# Patient Record
Sex: MALE | Race: OTHER
[De-identification: names, ages, dates, MRNs, and addresses within clinical notes are randomized per-mention and may not be internally consistent; named-entity substitution may affect disease eponyms.]

---

## 2020-01-14 VITALS
DIASTOLIC BLOOD PRESSURE: 41 MMHG | SYSTOLIC BLOOD PRESSURE: 94 MMHG | SYSTOLIC BLOOD PRESSURE: 94 MMHG | DIASTOLIC BLOOD PRESSURE: 41 MMHG

## 2020-12-28 ENCOUNTER — HOSPITAL ENCOUNTER (OUTPATIENT)
Dept: HOSPITAL 61 - SURGPAT | Age: 65
End: 2020-12-28
Attending: ORTHOPAEDIC SURGERY
Payer: COMMERCIAL

## 2020-12-28 DIAGNOSIS — Z01.818: Primary | ICD-10-CM

## 2020-12-28 DIAGNOSIS — M16.11: ICD-10-CM

## 2020-12-28 DIAGNOSIS — R91.8: ICD-10-CM

## 2020-12-28 LAB
ALBUMIN SERPL-MCNC: 3.4 G/DL (ref 3.4–5)
ANION GAP SERPL CALC-SCNC: 12 MMOL/L (ref 6–14)
APTT BLD: 29 SEC (ref 24–38)
BASOPHILS # BLD AUTO: 0.1 X10^3/UL (ref 0–0.2)
BASOPHILS NFR BLD: 2 % (ref 0–3)
BUN SERPL-MCNC: 23 MG/DL (ref 8–26)
CALCIUM SERPL-MCNC: 8.9 MG/DL (ref 8.5–10.1)
CHLORIDE SERPL-SCNC: 103 MMOL/L (ref 98–107)
CO2 SERPL-SCNC: 26 MMOL/L (ref 21–32)
CREAT SERPL-MCNC: 0.8 MG/DL (ref 0.7–1.3)
CRP SERPL-MCNC: 10.4 MG/L (ref 0–3.3)
EOSINOPHIL NFR BLD: 0.2 X10^3/UL (ref 0–0.7)
EOSINOPHIL NFR BLD: 3 % (ref 0–3)
ERYTHROCYTE [DISTWIDTH] IN BLOOD BY AUTOMATED COUNT: 16.7 % (ref 11.5–14.5)
GFR SERPLBLD BASED ON 1.73 SQ M-ARVRAT: 97.3 ML/MIN
GLUCOSE SERPL-MCNC: 82 MG/DL (ref 70–99)
HBA1C MFR BLD: 5.9 % (ref 4.8–5.6)
HCT VFR BLD CALC: 29.4 % (ref 39–53)
HGB BLD-MCNC: 9.9 G/DL (ref 13–17.5)
LYMPHOCYTES # BLD: 0.8 X10^3/UL (ref 1–4.8)
LYMPHOCYTES NFR BLD AUTO: 13 % (ref 24–48)
MCH RBC QN AUTO: 29 PG (ref 25–35)
MCHC RBC AUTO-ENTMCNC: 34 G/DL (ref 31–37)
MCV RBC AUTO: 86 FL (ref 79–100)
MONO #: 0.6 X10^3/UL (ref 0–1.1)
MONOCYTES NFR BLD: 10 % (ref 0–9)
NEUT #: 4.8 X10^3/UL (ref 1.8–7.7)
NEUTROPHILS NFR BLD AUTO: 73 % (ref 31–73)
PLATELET # BLD AUTO: 344 X10^3/UL (ref 140–400)
POTASSIUM SERPL-SCNC: 3.8 MMOL/L (ref 3.5–5.1)
PROTHROMBIN TIME: 12 SEC (ref 11.7–14)
RBC # BLD AUTO: 3.43 X10^6/UL (ref 4.3–5.7)
SODIUM SERPL-SCNC: 141 MMOL/L (ref 136–145)
WBC # BLD AUTO: 6.6 X10^3/UL (ref 4–11)

## 2020-12-28 PROCEDURE — 71046 X-RAY EXAM CHEST 2 VIEWS: CPT

## 2020-12-28 PROCEDURE — 86140 C-REACTIVE PROTEIN: CPT

## 2020-12-28 PROCEDURE — 85730 THROMBOPLASTIN TIME PARTIAL: CPT

## 2020-12-28 PROCEDURE — 93005 ELECTROCARDIOGRAM TRACING: CPT

## 2020-12-28 PROCEDURE — 83036 HEMOGLOBIN GLYCOSYLATED A1C: CPT

## 2020-12-28 PROCEDURE — 85610 PROTHROMBIN TIME: CPT

## 2020-12-28 PROCEDURE — 87641 MR-STAPH DNA AMP PROBE: CPT

## 2020-12-28 PROCEDURE — 85025 COMPLETE CBC W/AUTO DIFF WBC: CPT

## 2020-12-28 PROCEDURE — 82306 VITAMIN D 25 HYDROXY: CPT

## 2020-12-28 PROCEDURE — 82040 ASSAY OF SERUM ALBUMIN: CPT

## 2020-12-28 PROCEDURE — 36415 COLL VENOUS BLD VENIPUNCTURE: CPT

## 2020-12-28 PROCEDURE — 80048 BASIC METABOLIC PNL TOTAL CA: CPT

## 2020-12-28 NOTE — EKG
[facility] acute hospital

              [address] [place] [address]

Test Date:    2020               Test Time:    10:53:01

Pat Name:     CHAY LOYOLA          Department:   

Patient ID:   [id number]           Room:          

Gender:       M                        Technician:   

:          1955               Requested By: DRU MUNOZ

Order Number: [id number]           Reading MD:   Chidi Maxwell

                                 Measurements

Intervals                              Axis          

Rate:         69                       P:            54

ME:           138                      QRS:          70

QRSD:         74                       T:            59

QT:           380                                    

QTc:          409                                    

                           Interpretive Statements

SINUS RHYTHM

NORMAL ECG

RI6.02

Compared to ECG 2020 21:27:32

No significant changes

Electronically Signed On 2020 11:49:07 CST by Chidi Maxwell

## 2020-12-28 NOTE — RAD
XR CHEST 2V 



INDICATION: Reason: PRE-OP RIGHT HIP REPLACEMENT 1/17/21 / Spl. Instructions:  / History: . 



COMPARISON STUDY: 1/12/2020.



FINDINGS:



Lungs: Normal lung volume. Right lower lung zone nodular opacity, new since 1/12/2020.



Pleura: No pleural effusion or pneumothorax.



Heart and Mediastinum: The cardiomediastinal silhouette is normal. The great vessels of the thorax ar
e normal.



Bones and Soft Tissues: The bones and soft tissues are within normal limits.



IMPRESSION:  

New right lower lung zone nodular opacity, nonspecific but could represent a focus of infection/infla
mmation or potentially a pulmonary nodule. This should be further characterized with unenhanced CT ch
est.



Electronically signed by: Emilio Marie MD (12/28/2020 1:45 PM) [id number]

## 2021-04-28 ENCOUNTER — HOSPITAL ENCOUNTER (OUTPATIENT)
Dept: HOSPITAL 61 - SURGPAT | Age: 66
End: 2021-04-28
Attending: ORTHOPAEDIC SURGERY
Payer: COMMERCIAL

## 2021-04-28 DIAGNOSIS — Z20.822: ICD-10-CM

## 2021-04-28 DIAGNOSIS — Z01.812: Primary | ICD-10-CM

## 2021-04-28 DIAGNOSIS — M16.11: ICD-10-CM

## 2021-04-28 LAB
ALBUMIN SERPL-MCNC: 4 G/DL (ref 3.4–5)
ANION GAP SERPL CALC-SCNC: 11 MMOL/L (ref 6–14)
APTT BLD: 29 SEC (ref 24–38)
BASOPHILS # BLD AUTO: 0.1 X10^3/UL (ref 0–0.2)
BASOPHILS NFR BLD: 1 % (ref 0–3)
BUN SERPL-MCNC: 20 MG/DL (ref 8–26)
CALCIUM SERPL-MCNC: 8.9 MG/DL (ref 8.5–10.1)
CHLORIDE SERPL-SCNC: 99 MMOL/L (ref 98–107)
CO2 SERPL-SCNC: 28 MMOL/L (ref 21–32)
CREAT SERPL-MCNC: 1.2 MG/DL (ref 0.7–1.3)
EOSINOPHIL NFR BLD: 0 X10^3/UL (ref 0–0.7)
EOSINOPHIL NFR BLD: 1 % (ref 0–3)
ERYTHROCYTE [DISTWIDTH] IN BLOOD BY AUTOMATED COUNT: 16.2 % (ref 11.5–14.5)
GFR SERPLBLD BASED ON 1.73 SQ M-ARVRAT: 60.8 ML/MIN
GLUCOSE SERPL-MCNC: 106 MG/DL (ref 70–99)
HCT VFR BLD CALC: 36.4 % (ref 39–53)
HGB BLD-MCNC: 12.3 G/DL (ref 13–17.5)
LYMPHOCYTES # BLD: 1.1 X10^3/UL (ref 1–4.8)
LYMPHOCYTES NFR BLD AUTO: 23 % (ref 24–48)
MCH RBC QN AUTO: 30 PG (ref 25–35)
MCHC RBC AUTO-ENTMCNC: 34 G/DL (ref 31–37)
MCV RBC AUTO: 88 FL (ref 79–100)
MONO #: 0.6 X10^3/UL (ref 0–1.1)
MONOCYTES NFR BLD: 11 % (ref 0–9)
NEUT #: 3.2 X10^3/UL (ref 1.8–7.7)
NEUTROPHILS NFR BLD AUTO: 64 % (ref 31–73)
PLATELET # BLD AUTO: 388 X10^3/UL (ref 140–400)
POTASSIUM SERPL-SCNC: 2.9 MMOL/L (ref 3.5–5.1)
PROTHROMBIN TIME: 12.4 SEC (ref 11.7–14)
RBC # BLD AUTO: 4.15 X10^6/UL (ref 4.3–5.7)
SODIUM SERPL-SCNC: 138 MMOL/L (ref 136–145)
WBC # BLD AUTO: 5 X10^3/UL (ref 4–11)

## 2021-04-28 PROCEDURE — 82040 ASSAY OF SERUM ALBUMIN: CPT

## 2021-04-28 PROCEDURE — U0003 INFECTIOUS AGENT DETECTION BY NUCLEIC ACID (DNA OR RNA); SEVERE ACUTE RESPIRATORY SYNDROME CORONAVIRUS 2 (SARS-COV-2) (CORONAVIRUS DISEASE [COVID-19]), AMPLIFIED PROBE TECHNIQUE, MAKING USE OF HIGH THROUGHPUT TECHNOLOGIES AS DESCRIBED BY CMS-2020-01-R: HCPCS

## 2021-04-28 PROCEDURE — 85651 RBC SED RATE NONAUTOMATED: CPT

## 2021-04-28 PROCEDURE — 85610 PROTHROMBIN TIME: CPT

## 2021-04-28 PROCEDURE — 85730 THROMBOPLASTIN TIME PARTIAL: CPT

## 2021-04-28 PROCEDURE — 82306 VITAMIN D 25 HYDROXY: CPT

## 2021-04-28 PROCEDURE — 85025 COMPLETE CBC W/AUTO DIFF WBC: CPT

## 2021-04-28 PROCEDURE — 83036 HEMOGLOBIN GLYCOSYLATED A1C: CPT

## 2021-04-28 PROCEDURE — 87641 MR-STAPH DNA AMP PROBE: CPT

## 2021-04-28 PROCEDURE — 80048 BASIC METABOLIC PNL TOTAL CA: CPT

## 2021-04-28 NOTE — NUR
PT HERE FOR PRETESTING PRIOR TO RIGHT HIP ARTHROPLASTY ON 5/4/2021. PREOP LABS K+ 2.9. DR ALEXANDRE'S OFFICE NOTIFIED OF LAB RESULTS. SPOKE WITH ROSIE DAVIES. FAXED LAB RESULTS TO ROSIE AT 
DR ALEXANDRE'S OFFICE.

## 2021-04-29 LAB — HBA1C MFR BLD: 5.8 % (ref 4.8–5.6)

## 2021-05-04 ENCOUNTER — HOSPITAL ENCOUNTER (INPATIENT)
Dept: HOSPITAL 61 - SURG | Age: 66
LOS: 3 days | Discharge: NURSING, REHAB OR LONG-TERM CARE | DRG: 470 | End: 2021-05-07
Attending: ORTHOPAEDIC SURGERY | Admitting: ORTHOPAEDIC SURGERY
Payer: COMMERCIAL

## 2021-05-04 VITALS — HEIGHT: 70 IN | WEIGHT: 132.94 LBS | BODY MASS INDEX: 19.03 KG/M2

## 2021-05-04 VITALS — DIASTOLIC BLOOD PRESSURE: 53 MMHG | SYSTOLIC BLOOD PRESSURE: 107 MMHG

## 2021-05-04 VITALS — SYSTOLIC BLOOD PRESSURE: 108 MMHG | DIASTOLIC BLOOD PRESSURE: 54 MMHG

## 2021-05-04 VITALS — SYSTOLIC BLOOD PRESSURE: 91 MMHG | DIASTOLIC BLOOD PRESSURE: 34 MMHG

## 2021-05-04 VITALS — SYSTOLIC BLOOD PRESSURE: 100 MMHG | DIASTOLIC BLOOD PRESSURE: 36 MMHG

## 2021-05-04 VITALS — DIASTOLIC BLOOD PRESSURE: 36 MMHG | SYSTOLIC BLOOD PRESSURE: 90 MMHG

## 2021-05-04 VITALS — DIASTOLIC BLOOD PRESSURE: 63 MMHG | SYSTOLIC BLOOD PRESSURE: 90 MMHG

## 2021-05-04 VITALS — DIASTOLIC BLOOD PRESSURE: 68 MMHG | SYSTOLIC BLOOD PRESSURE: 105 MMHG

## 2021-05-04 VITALS — DIASTOLIC BLOOD PRESSURE: 54 MMHG | SYSTOLIC BLOOD PRESSURE: 110 MMHG

## 2021-05-04 VITALS — SYSTOLIC BLOOD PRESSURE: 91 MMHG | DIASTOLIC BLOOD PRESSURE: 39 MMHG

## 2021-05-04 VITALS — DIASTOLIC BLOOD PRESSURE: 52 MMHG | SYSTOLIC BLOOD PRESSURE: 98 MMHG

## 2021-05-04 VITALS — SYSTOLIC BLOOD PRESSURE: 106 MMHG | DIASTOLIC BLOOD PRESSURE: 53 MMHG

## 2021-05-04 DIAGNOSIS — I10: ICD-10-CM

## 2021-05-04 DIAGNOSIS — G89.29: ICD-10-CM

## 2021-05-04 DIAGNOSIS — Z20.822: ICD-10-CM

## 2021-05-04 DIAGNOSIS — M16.11: Primary | ICD-10-CM

## 2021-05-04 DIAGNOSIS — Z79.899: ICD-10-CM

## 2021-05-04 DIAGNOSIS — Z96.641: ICD-10-CM

## 2021-05-04 DIAGNOSIS — F42.9: ICD-10-CM

## 2021-05-04 DIAGNOSIS — D62: ICD-10-CM

## 2021-05-04 LAB — PROTHROMBIN TIME: 12 SEC (ref 11.7–14)

## 2021-05-04 PROCEDURE — 0SR904Z REPLACEMENT OF RIGHT HIP JOINT WITH CERAMIC ON POLYETHYLENE SYNTHETIC SUBSTITUTE, OPEN APPROACH: ICD-10-PCS | Performed by: ORTHOPAEDIC SURGERY

## 2021-05-04 PROCEDURE — 73502 X-RAY EXAM HIP UNI 2-3 VIEWS: CPT

## 2021-05-04 PROCEDURE — 87426 SARSCOV CORONAVIRUS AG IA: CPT

## 2021-05-04 PROCEDURE — 81001 URINALYSIS AUTO W/SCOPE: CPT

## 2021-05-04 PROCEDURE — 85610 PROTHROMBIN TIME: CPT

## 2021-05-04 PROCEDURE — A6258 TRANSPARENT FILM >16<=48 IN: HCPCS

## 2021-05-04 PROCEDURE — G0378 HOSPITAL OBSERVATION PER HR: HCPCS

## 2021-05-04 PROCEDURE — 84132 ASSAY OF SERUM POTASSIUM: CPT

## 2021-05-04 PROCEDURE — A6550 NEG PRES WOUND THER DRSG SET: HCPCS

## 2021-05-04 PROCEDURE — 86901 BLOOD TYPING SEROLOGIC RH(D): CPT

## 2021-05-04 PROCEDURE — C1776 JOINT DEVICE (IMPLANTABLE): HCPCS

## 2021-05-04 PROCEDURE — 76000 FLUOROSCOPY <1 HR PHYS/QHP: CPT

## 2021-05-04 PROCEDURE — 85018 HEMOGLOBIN: CPT

## 2021-05-04 PROCEDURE — A4930 STERILE, GLOVES PER PAIR: HCPCS

## 2021-05-04 PROCEDURE — A6402 STERILE GAUZE <= 16 SQ IN: HCPCS

## 2021-05-04 PROCEDURE — 86850 RBC ANTIBODY SCREEN: CPT

## 2021-05-04 PROCEDURE — A6223 GAUZE >16<=48 NO W/SAL W/O B: HCPCS

## 2021-05-04 PROCEDURE — 86900 BLOOD TYPING SEROLOGIC ABO: CPT

## 2021-05-04 PROCEDURE — 85014 HEMATOCRIT: CPT

## 2021-05-04 PROCEDURE — 36415 COLL VENOUS BLD VENIPUNCTURE: CPT

## 2021-05-04 PROCEDURE — A4213 20+ CC SYRINGE ONLY: HCPCS

## 2021-05-04 PROCEDURE — G0379 DIRECT REFER HOSPITAL OBSERV: HCPCS

## 2021-05-04 PROCEDURE — U0003 INFECTIOUS AGENT DETECTION BY NUCLEIC ACID (DNA OR RNA); SEVERE ACUTE RESPIRATORY SYNDROME CORONAVIRUS 2 (SARS-COV-2) (CORONAVIRUS DISEASE [COVID-19]), AMPLIFIED PROBE TECHNIQUE, MAKING USE OF HIGH THROUGHPUT TECHNOLOGIES AS DESCRIBED BY CMS-2020-01-R: HCPCS

## 2021-05-04 RX ADMIN — Medication SCH MG: at 17:15

## 2021-05-04 RX ADMIN — ONDANSETRON SCH MG: 2 INJECTION INTRAMUSCULAR; INTRAVENOUS at 12:00

## 2021-05-04 RX ADMIN — BENZTROPINE MESYLATE SCH MG: 1 TABLET ORAL at 17:15

## 2021-05-04 RX ADMIN — CEFAZOLIN SCH GM: 330 INJECTION, POWDER, FOR SOLUTION INTRAMUSCULAR; INTRAVENOUS at 20:21

## 2021-05-04 RX ADMIN — ONDANSETRON SCH MG: 4 TABLET, ORALLY DISINTEGRATING ORAL at 12:00

## 2021-05-04 RX ADMIN — BACITRACIN SCH MLS/HR: 5000 INJECTION, POWDER, FOR SOLUTION INTRAMUSCULAR at 12:30

## 2021-05-04 RX ADMIN — ONDANSETRON SCH MG: 2 INJECTION INTRAMUSCULAR; INTRAVENOUS at 17:16

## 2021-05-04 RX ADMIN — CEFAZOLIN SCH GM: 330 INJECTION, POWDER, FOR SOLUTION INTRAMUSCULAR; INTRAVENOUS at 14:52

## 2021-05-04 RX ADMIN — ONDANSETRON SCH MG: 4 TABLET, ORALLY DISINTEGRATING ORAL at 17:15

## 2021-05-04 RX ADMIN — FENTANYL CITRATE PRN MCG: 50 INJECTION INTRAMUSCULAR; INTRAVENOUS at 11:58

## 2021-05-04 RX ADMIN — MULTIPLE VITAMINS W/ MINERALS TAB SCH TAB: TAB at 09:00

## 2021-05-04 RX ADMIN — FENTANYL CITRATE PRN MCG: 50 INJECTION INTRAMUSCULAR; INTRAVENOUS at 12:17

## 2021-05-04 NOTE — NUR
Patient arrived from PACU around 1146 in a bed. Sister Renee at bedside. He is on room air. 
Vital signs stable. He is very sedated and is not really able to answer questions upon 
admission so his sister was able to assist with admission questions. JONH dressing to right 
hip working properly with small amount of bloody drainage noted. Dentures at bedside. Knee 
high DHARA hose on and SCDs. Patient likes to cross his legs and sleep on his side so 
education was given in regards to hip precautions even though he had an anterior surgery 
which does not require them to be as strict. He is malnourished so Dietary consult was 
placed. Patients sister requested for possible SNF at discharge and stated patient needs a 
walker. Will continue to monitor.

## 2021-05-04 NOTE — PDOC4
Operative Note


Operative Note


Date of surgery: 5/4/2021





Preoperative diagnosis: Degenerative joint disease right hip





Postoperative diagnosis: Same





Operative procedure: Right total hip arthroplasty with anterior approach 





Surgeon Peña





Assistant: Yoni castaneda





Anesthesia: General





Estimated blood loss: 150 cc





Complications: None





Drains: None





Operative indications: Please see my orthopedic clinic note and dictated history

and physical for detailed operative indications and note that we covered risks 

benefits postoperative course of the procedure.  All his questions were answered

and she wishes to proceed with surgical evaluation and treatment having given 

informed consent





Operative text: Patient was identified procedure verified patient placed in the 

supine position on the [place] fracture table after adequate amounts of general 

anesthesia were administered.  All bony prominences were well-padded and right 

hip was prepped and draped in the standard sterile fashion.  After timeout was 

performed patient procedure identified and verified an incision was made just 

distal to the anterior superior iliac spine running along the tensor fascia evelio

for a distance of about 4 inches.  Fascia was incised tensor fascia evelio was 

taken laterally and circumflex vessels were located and coagulated and the 

anterior capsule was exposed with the rectus femoris gently retracted medially 

along with the underlying fascia that was dissected free.  Capsule was split in 

a T-shaped incision and superior aspect of the capsule was excised and further 

superior release was carried out with the hip in external rotation.  Hip was 

returned to 40 degrees external rotation and a napkin ring cut was made with an 

Avenir Jaden broach for reference napkin ring was removed and femoral head was 

removed and sized.  Reaming was carried out from a size 49 to a size 53 with a 

size 54 Biomet G7 acetabular shell was placed in proper version under 

fluoroscopic guidance and a single superior screw was placed for additional 

fixation.  A 36 mm vitamin E liner was impacted into place.  Femur was brought 

into maximum external rotation extension and adduction and release was carried 

out at the 11 o'clock position to free up the femur and retractors were placed 

medially and above the greater trochanter for maximum femoral exposure box 

osteotome was used along with the rattail rasp and successive size broaching up 

to a size 5 which provided excellent stability and fit within the canal.  Calcar

reaming was carried out and trial fitting with a +3.5 36 mm head to reproduce 

leg length and offset appropriately.  This was verified under fluoroscopic g

uidance.  Trial components were removed and a size 5 standard offset collared 

Avenir stem was impacted into place with a +3.5 ceramic 36 mm head.  Excellent 

stability and range of motion were noted and leg length reproduced according to 

measurements from the contralateral side.  Thorough irrigation carried out with 

dilute Betadine solution and then washed further with normal saline solution and

pulse lavage.  Intra-articular mixture was injected subperiosteally throughout 

the joint capsule and subcutaneous areas and 1 g vancomycin sprinkled throughout

the joint capsule area.  Fascia was closed with #1 PDS strata fix suture in a 

running fashion subcutaneous closure with buried Vicryl skin closure with 

subcuticular Monocryl and a oralia dressing was applied.  Patient was returned to 

recovery room in stable condition having tolerated the procedure well.  Yoni castaneda was present for the procedure and assisted in the patient 

positioning prepping draping retraction closure and dressings











DRU MUNOZ MD            May 4, 2021 15:35

## 2021-05-04 NOTE — NUR
Patient assisted to restroom and attempting to void. Patient unable to void at this time. 
Informed Patient he may need straight catheter. Patient refused at this time.

-------------------------------------------------------------------------------

Addendum: 05/04/21 at 2142 by REBECCA BOUDREAUX RN

-------------------------------------------------------------------------------

Amended: Links added.

## 2021-05-04 NOTE — HP
ADMIT DATE: 05/04/2021

PREOPERATIVE HISTORY AND PHYSICAL



CHIEF COMPLAINT:  Bilateral hip pain, right worse than left.



HISTORY OF PRESENT ILLNESS:  The patient is a 66-year-old male who has many 

years of progressively worsening hip pain that is acutely worse on the chronic 

pain he is feeling in the right groin radiating to the right upper thigh and has

had severe right groin pain worse with activity over the past year, worse in the

cold and has been taking a lot of ibuprofen as a result, but very limited in his

activities of daily living.



PAST MEDICAL HISTORY:  Significant for hypertension and obsessive compulsive 

disorder.



PAST SURGICAL HISTORY:  Bilateral hip surgeries in the past for iliotibial band 

release.



FAMILY HISTORY:  He denies any significant family history.



SOCIAL HISTORY:  Denies smoking, alcohol or drug use.



MEDICATIONS:  List is reviewed.



ALLERGIES:  He has no known drug allergies.



REVIEW OF SYSTEMS:  No recent febrile illness, chest pain, shortness of breath, 

constitutional symptoms or other medical problems.



PHYSICAL EXAMINATION:

GENERAL:  He is a pleasant, cooperative 66-year-old male.

VITAL SIGNS:  Height 70 inches, weight 133 pounds.

HEENT:  Atraumatic, normocephalic.

HEART:  Regular rate and rhythm.

LUNGS:  Clear to auscultation bilaterally.

ABDOMEN:  Benign.

EXTREMITIES:  Examination of the left hip reveals severe groin pain on the right

with even limited right hip range of motion, worse than the left.  He has a 

negative straight leg raise bilaterally.  Moderate pain on movement of the left 

hip.  Normal alignment, stability, bilateral knees and ankles.



LABORATORY DATA:  X-rays show severe degenerative change on the right hip with 

evidence of previous core decompressions bilaterally and better maintenance of 

the joint line on the left hip.



IMPRESSION:  Right hip pain with osteoarthritis and history of core 

decompression, bilateral hips.



TREATMENT PLAN:  I had gone over with him that his hip pain is really worsening 

despite nonoperative treatment and that he has severe degenerative change.  We 

talked about risks, benefits, postoperative course in his previous clinic visit 

and reviewed those today including the possibility of leg length inequality, 

nerve or blood vessel damage, infection, instability, premature wear, loosening,

medical or other anesthetic complications among others.  All of his questions 

were answered.  He wishes to proceed with surgical evaluation and treatment for 

a right total hip arthroplasty and will undergo joint center observation to 

follow.







NAJMA/SUGEY/CHIQUI

DR: NAJMA/nuzhat   DD: 05/04/2021 07:48

DT: 05/04/2021 08:11   TID: [id number]

## 2021-05-05 VITALS — DIASTOLIC BLOOD PRESSURE: 34 MMHG | SYSTOLIC BLOOD PRESSURE: 91 MMHG

## 2021-05-05 VITALS — SYSTOLIC BLOOD PRESSURE: 95 MMHG | DIASTOLIC BLOOD PRESSURE: 43 MMHG

## 2021-05-05 VITALS — DIASTOLIC BLOOD PRESSURE: 45 MMHG | SYSTOLIC BLOOD PRESSURE: 102 MMHG

## 2021-05-05 LAB
HCT VFR BLD CALC: 25.9 % (ref 39–53)
HGB BLD-MCNC: 8.6 G/DL (ref 13–17.5)
MCHC RBC AUTO-ENTMCNC: 33 G/DL (ref 31–37)
PROTHROMBIN TIME: 15.3 SEC (ref 11.7–14)

## 2021-05-05 RX ADMIN — BENZTROPINE MESYLATE SCH MG: 1 TABLET ORAL at 07:54

## 2021-05-05 RX ADMIN — Medication SCH MG: at 07:53

## 2021-05-05 RX ADMIN — ONDANSETRON SCH MG: 4 TABLET, ORALLY DISINTEGRATING ORAL at 00:05

## 2021-05-05 RX ADMIN — GABAPENTIN SCH MG: 100 CAPSULE ORAL at 05:31

## 2021-05-05 RX ADMIN — ONDANSETRON SCH MG: 2 INJECTION INTRAMUSCULAR; INTRAVENOUS at 00:05

## 2021-05-05 RX ADMIN — ACETAMINOPHEN SCH MG: 500 TABLET ORAL at 21:15

## 2021-05-05 RX ADMIN — ACETAMINOPHEN SCH MG: 500 TABLET ORAL at 07:53

## 2021-05-05 RX ADMIN — Medication SCH MG: at 17:20

## 2021-05-05 RX ADMIN — CEFAZOLIN SCH GM: 330 INJECTION, POWDER, FOR SOLUTION INTRAMUSCULAR; INTRAVENOUS at 01:55

## 2021-05-05 RX ADMIN — GABAPENTIN SCH MG: 100 CAPSULE ORAL at 21:14

## 2021-05-05 RX ADMIN — BACITRACIN SCH MLS/HR: 5000 INJECTION, POWDER, FOR SOLUTION INTRAMUSCULAR at 20:50

## 2021-05-05 RX ADMIN — BACITRACIN SCH MLS/HR: 5000 INJECTION, POWDER, FOR SOLUTION INTRAMUSCULAR at 00:06

## 2021-05-05 RX ADMIN — MELOXICAM SCH MG: 7.5 TABLET ORAL at 07:52

## 2021-05-05 RX ADMIN — GABAPENTIN SCH MG: 100 CAPSULE ORAL at 12:44

## 2021-05-05 RX ADMIN — Medication PRN EACH: at 13:15

## 2021-05-05 RX ADMIN — ONDANSETRON SCH MG: 4 TABLET, ORALLY DISINTEGRATING ORAL at 05:32

## 2021-05-05 RX ADMIN — MULTIPLE VITAMINS W/ MINERALS TAB SCH TAB: TAB at 07:53

## 2021-05-05 RX ADMIN — ONDANSETRON SCH MG: 2 INJECTION INTRAMUSCULAR; INTRAVENOUS at 05:31

## 2021-05-05 RX ADMIN — ACETAMINOPHEN SCH MG: 500 TABLET ORAL at 15:36

## 2021-05-05 NOTE — NUR
Patient agreeable to a male nurse straight cathing him, (orlando).  Ellis, ICU RN cathed 
patient, reported no resistance and left.  This RN removed cath and measured 900cc clear 
yellow urine.

## 2021-05-05 NOTE — PDOC
PROGRESS NOTES


Date of Service


DATE: 5/5/21 


TIME: 21:26





Subjective


Subjective


Problems overnight: Overall is doing well in terms of pain control he is taking 

oxycodone for more severe pain alternating with tramadol for less severe and 

still is needing some help to get up and around





Objective


Vital Signs





Vital Signs








  Date Time  Temp Pulse Resp B/P (MAP) Pulse Ox O2 Delivery O2 Flow Rate FiO2


 


5/5/21 21:15   22   Room Air  


 


5/5/21 18:20 97.9 75  102/45 (64) 100   





 97.9       








Physical Exam


He has some bloody drainage at the distal aspect of his oralia drain but no 

redness or erythema leg lengths are equal distal neurovascular status intact he 

has good motion and stability


Labs





Laboratory Tests








Test


 5/4/21


07:45 5/5/21


06:40 5/5/21


16:38


 


Prothrombin Time


 12.0 SEC


(11.7-14.0) 15.3 SEC


(11.7-14.0) 





 


Prothromb Time International


Ratio 0.9 (0.8-1.1) 


 1.3 (0.8-1.1) 


 





 


Potassium Level


 4.3 mmol/L


(3.5-5.1) 


 





 


Hemoglobin


 


 8.6 g/dL


(13.0-17.5) 





 


Hematocrit


 


 25.9 %


(39.0-53.0) 





 


Mean Corpuscular Hemoglobin


Concent 


 33 g/dL


(31-37) 





 


SARS-CoV-2 Antigen (Rapid)


 


 


 Negative


(NEGATIVE)








Laboratory Tests








Test


 5/5/21


06:40 5/5/21


16:38


 


Hemoglobin


 8.6 g/dL


(13.0-17.5) 





 


Hematocrit


 25.9 %


(39.0-53.0) 





 


Mean Corpuscular Hemoglobin


Concent 33 g/dL


(31-37) 





 


Prothrombin Time


 15.3 SEC


(11.7-14.0) 





 


Prothromb Time International


Ratio 1.3 (0.8-1.1) 


 





 


SARS-CoV-2 Antigen (Rapid)


 


 Negative


(NEGATIVE)








Imaging


Intra-Op views fluoroscopically of the right hip show excellent alignment total 

hip arthroplasty





Assessment


Assessment


POD#1 right total hip arthroplasty





Plan


Plan of Care


He would like to go to a skilled nursing facility temporarily due to his 

limitations and his family taking care of 100-year-old father


Coumadin anticoagulation per pharmacy


Continue mobilize with physical therapy


Likely Oralia dressing change before discharge tomorrow





Dillantion of Admission Dx:


Justifications for Admission:


Justification of Admission Dx:  N/A











DRU MUNOZ MD            May 5, 2021 21:29

## 2021-05-05 NOTE — NUR
Has attempted to urinate 4x since 1900.  Won't use urinal, unsure of voided amount.  Bladder 
scan shows 736cc.  Refused to be straight cathed.  "It will hurt too much."  Patient 
informed of procedure, he verbalizes understanding.

## 2021-05-05 NOTE — NUR
Pharmacy Warfarin Dosing Note



S: Pharmacy consulted to assist with anticoagulation therapy started 05/03/21 



O: CHAY LOYOLA is a 66 year old M with RUSS 



LABS:

Last INR: 1.3 

Last HGB: 8.6 

Last HCT: 25.9 

Last PLT: 



Last dose of 7.5 mg given on 05/04/21 at 1715 

Vitamin K given: N 

Ongoing Drug Interactions: FLUOXETINE, MELOXICAM 







A:INR of 1.3  is below desired range. Target range for this patient is: 1.6 - 2.5



P: Warfarin dose: 4 mg Today at 1600

Bridge Therapy: none

Next INR due tomorrow

Pharmacy anticoagulation service will continue to follow.



Naomi Lester RPH, 05/05/21 [id number]

## 2021-05-05 NOTE — NUR
This nurse changed the patients JONH dressing around 0915 because it was fully covered with 
blood. Incision was intact with no signs of active bleeding noted during dressing change and 
no dehiscence noted. JONH dressing applied without complications. Dressing is only about 1/4 
covered with blood by 1800. Dr Pompa was notified in person and stated to not change it 
again tonight and to just leave it on till tomorrow or the next day to be changed on the day 
of discharge hopefully. Night shift RN notified and aware.

## 2021-05-05 NOTE — RAD
Right hip: 5/4/2021 11:53 AM.



Reason for study: Postoperative



Comparison: None.



Technique: Two views of the right hip are obtained.



Findings:

There are findings consistent with recent right total hip arthroplasty. Femoral and acetabular hardwa
re is in good alignment and position. Immediate postsurgical changes within the regional soft tissues
 are noted. Acetabular screw does project on the medial cortex of the acetabulum.



Impression:



Status post right total hip arthroplasty. Acetabular screw projects beyond the medial cortex of the a
cetabulum.



Electronically signed by: Arianna Boland MD (5/5/2021 8:39 AM) [id number]

## 2021-05-06 VITALS — DIASTOLIC BLOOD PRESSURE: 47 MMHG | SYSTOLIC BLOOD PRESSURE: 99 MMHG

## 2021-05-06 VITALS — SYSTOLIC BLOOD PRESSURE: 107 MMHG | DIASTOLIC BLOOD PRESSURE: 47 MMHG

## 2021-05-06 VITALS — SYSTOLIC BLOOD PRESSURE: 107 MMHG | DIASTOLIC BLOOD PRESSURE: 37 MMHG

## 2021-05-06 VITALS — SYSTOLIC BLOOD PRESSURE: 119 MMHG | DIASTOLIC BLOOD PRESSURE: 56 MMHG

## 2021-05-06 LAB
APTT PPP: YELLOW S
BACTERIA #/AREA URNS HPF: 0 /HPF
BILIRUB UR QL STRIP: NEGATIVE
FIBRINOGEN PPP-MCNC: CLEAR MG/DL
HCT VFR BLD CALC: 23.4 % (ref 39–53)
HGB BLD-MCNC: 7.7 G/DL (ref 13–17.5)
MCHC RBC AUTO-ENTMCNC: 33 G/DL (ref 31–37)
NITRITE UR QL STRIP: NEGATIVE
PH UR STRIP: 7.5 [PH]
PROT UR STRIP-MCNC: NEGATIVE MG/DL
PROTHROMBIN TIME: 23.7 SEC (ref 11.7–14)
RBC #/AREA URNS HPF: 0 /HPF (ref 0–2)
SPERM #/AREA URNS HPF: PRESENT /HPF
UROBILINOGEN UR-MCNC: 0.2 MG/DL
WBC #/AREA URNS HPF: (no result) /HPF (ref 0–4)

## 2021-05-06 RX ADMIN — Medication PRN EACH: at 11:08

## 2021-05-06 RX ADMIN — GABAPENTIN SCH MG: 100 CAPSULE ORAL at 22:00

## 2021-05-06 RX ADMIN — Medication SCH MG: at 08:34

## 2021-05-06 RX ADMIN — MULTIPLE VITAMINS W/ MINERALS TAB SCH TAB: TAB at 08:32

## 2021-05-06 RX ADMIN — ACETAMINOPHEN SCH MG: 500 TABLET ORAL at 15:00

## 2021-05-06 RX ADMIN — ACETAMINOPHEN SCH MG: 500 TABLET ORAL at 03:00

## 2021-05-06 RX ADMIN — MELOXICAM SCH MG: 7.5 TABLET ORAL at 08:32

## 2021-05-06 RX ADMIN — ACETAMINOPHEN SCH MG: 500 TABLET ORAL at 21:20

## 2021-05-06 RX ADMIN — GABAPENTIN SCH MG: 100 CAPSULE ORAL at 08:32

## 2021-05-06 RX ADMIN — GABAPENTIN SCH MG: 100 CAPSULE ORAL at 06:00

## 2021-05-06 RX ADMIN — ACETAMINOPHEN SCH MG: 500 TABLET ORAL at 08:32

## 2021-05-06 RX ADMIN — BENZTROPINE MESYLATE SCH MG: 1 TABLET ORAL at 08:35

## 2021-05-06 RX ADMIN — TAMSULOSIN HYDROCHLORIDE SCH MG: 0.4 CAPSULE ORAL at 09:41

## 2021-05-06 RX ADMIN — Medication SCH MG: at 17:45

## 2021-05-06 NOTE — NUR
Pt having urgency and unable to void. Bladder scan and had over 700cc. Will straight cath. 
Cont. monitor.

## 2021-05-06 NOTE — NUR
Awake, assisted to toilet,  claims "he didnt pee that much".  

Needs lots of clues w/ maneuvering the walker.  Walks on ball of right foot.  

/37.  

Is restless in bed, reminded him to use call light for assist.  Reports understanding of 
order, but doesn't use it.

## 2021-05-06 NOTE — NUR
Prefers a male nurse so Mario DAVIES from Outpatient came to straight cath pt. Tolerated it 
well. Got 700cc of yellow urine. Encourage to increase po fluids. Cont. monitor.

## 2021-05-06 NOTE — SNU/HH DC
DISCHARGE ORDERS


DISCHARGE INFORMATION:


CONDITION ON DISCHARGE:  Stable





CODE STATUS:


Code Status:  Full





SKILLED NURSING:


SNF STAY <30 DAYS:  Yes





POST DISCHARGE ORDERS:


ACTIVITY ORDERS:  Activity as tolerated


WEIGHT BEARING STATUS:  As tolerated


DIET AFTER DISCHARGE:  Regular


WOUND/INCISION CARE:  Ice to area for comfort, Do not change dressing


OTHER WOUND INSTRUCTIONS:  Maintain oralia dressing unless saturated





FOLLOW-UP:


PHYSICIAN FOLLOW-UP:  Dr. Pompa or Divina 2 weeks postop





TREATMENT/EQUIPMENT ORDERS:


ADAPTIVE EQUIPMENT NEEDED:  Front wheeled walker


Physical Therapy For:  Evalulation/Treatment





DISCHARGE MEDICATIONS:


Home Meds


Reported Medications


Ergocalciferol (Vitamin D2) (Vitamin D2) 1,250 Mcg Capsule, 07739 UNITS PO 

WEEKLY for supplement, CAP


   5/4/21


Amlodipine Besylate (AMLODIPINE BESYLATE) 10 Mg Tablet, 10 MG PO DAILY for HTN, 

TAB


   5/4/21


Fluoxetine Hcl (FLUOXETINE HCL) 40 Mg Capsule, 1 CAP PO DAILYWBKFT for OCD, #30 

CAP 2 Refills


   5/4/21


Benztropine Mesylate (BENZTROPINE MESYLATE) 1 Mg Tablet, 1 TAB PO DAILY for 

anxiety, #60 TAB


   5/4/21


Discontinued Reported Medications


Fluvoxamine Maleate (FLUVOXAMINE MALEATE) 100 Mg Tablet, 200 MG PO DAILY for 

OCD, TAB


   1/13/20


Lisinopril (LISINOPRIL) 20 Mg Tablet, 1 TAB PO DAILY08, #30 TAB 5 Refills


   1/13/20











DRU POMPA MD            May 6, 2021 07:04

## 2021-05-06 NOTE — PDOC
PROGRESS NOTES


Date of Service


DATE: 5/6/21 


TIME: 06:56





Subjective


Subjective


Problems overnight: Had some difficulty urinating last night and was straight 

catheterized for about 900 cc and voided about 20 cc on his own.  He is in no 

distress otherwise





Objective


Vital Signs





Vital Signs








  Date Time  Temp Pulse Resp B/P (MAP) Pulse Ox O2 Delivery O2 Flow Rate FiO2


 


5/6/21 06:18   20   Room Air  


 


5/6/21 05:55 98.0 72  107/37 (60) 98   





 98.0       








Physical Exam


On examination he has some bloody drainage distal aspect of his incision no 

redness or erythema leg lengths equal distal neurovascular status intact


Labs





Laboratory Tests








Test


 5/4/21


07:45 5/5/21


06:40 5/5/21


16:38


 


Prothrombin Time


 12.0 SEC


(11.7-14.0) 15.3 SEC


(11.7-14.0) 





 


Prothromb Time International


Ratio 0.9 (0.8-1.1) 


 1.3 (0.8-1.1) 


 





 


Potassium Level


 4.3 mmol/L


(3.5-5.1) 


 





 


Hemoglobin


 


 8.6 g/dL


(13.0-17.5) 





 


Hematocrit


 


 25.9 %


(39.0-53.0) 





 


Mean Corpuscular Hemoglobin


Concent 


 33 g/dL


(31-37) 





 


SARS-CoV-2 Antigen (Rapid)


 


 


 Negative


(NEGATIVE)








Laboratory Tests








Test


 5/5/21


16:38


 


SARS-CoV-2 Antigen (Rapid)


 Negative


(NEGATIVE)











Assessment


Assessment


POD#2 right total hip arthroplasty





Plan


Plan of Care


Difficulty urinating, will start Flomax


Continue mobilization with physical therapy


Coumadin anticoagulation per pharmacy


Likely rehab placement later today





Justicifation of Admission Dx:


Justifications for Admission:


Justification of Admission Dx:  Yes (Urinary retention)











DRU MUNOZ MD            May 6, 2021 06:58

## 2021-05-06 NOTE — NUR
Pharmacy Warfarin Dosing Note



S: Pharmacy consulted to assist with anticoagulation therapy started 05/03/21 



O: CHAY LOYOLA is a 66 year old M with RUSS 



LABS:

Last INR: 2.1 

Last HGB: 8.6 

Last HCT: 25.9 

Last PLT: 



Last dose of 4 mg given on 05/05/21 at 1536 

Vitamin K given: N 

Ongoing Drug Interactions: FLUOXETINE, MELOXICAM 







A:INR of 2.1  is within desired range. Target range for this patient is: 1.6 - 2.5



P: Warfarin dose: Hold dose today due to rapid jump in INR

Bridge Therapy: none

Next INR due tomorrow

Pharmacy anticoagulation service will continue to follow.



Naomi Lester RPH, 05/06/21 [id number]

## 2021-05-07 VITALS — DIASTOLIC BLOOD PRESSURE: 44 MMHG | SYSTOLIC BLOOD PRESSURE: 106 MMHG

## 2021-05-07 VITALS — DIASTOLIC BLOOD PRESSURE: 54 MMHG | SYSTOLIC BLOOD PRESSURE: 115 MMHG

## 2021-05-07 VITALS — SYSTOLIC BLOOD PRESSURE: 106 MMHG | DIASTOLIC BLOOD PRESSURE: 49 MMHG

## 2021-05-07 LAB
HCT VFR BLD CALC: 23.1 % (ref 39–53)
HGB BLD-MCNC: 7.6 G/DL (ref 13–17.5)
MCHC RBC AUTO-ENTMCNC: 33 G/DL (ref 31–37)
PROTHROMBIN TIME: 20.1 SEC (ref 11.7–14)

## 2021-05-07 RX ADMIN — Medication SCH MG: at 08:29

## 2021-05-07 RX ADMIN — ACETAMINOPHEN SCH MG: 500 TABLET ORAL at 08:29

## 2021-05-07 RX ADMIN — GABAPENTIN SCH MG: 100 CAPSULE ORAL at 06:00

## 2021-05-07 RX ADMIN — MELOXICAM SCH MG: 7.5 TABLET ORAL at 08:30

## 2021-05-07 RX ADMIN — BENZTROPINE MESYLATE SCH MG: 1 TABLET ORAL at 08:30

## 2021-05-07 RX ADMIN — MULTIPLE VITAMINS W/ MINERALS TAB SCH TAB: TAB at 08:29

## 2021-05-07 RX ADMIN — BACITRACIN SCH MLS/HR: 5000 INJECTION, POWDER, FOR SOLUTION INTRAMUSCULAR at 08:15

## 2021-05-07 RX ADMIN — Medication PRN EACH: at 09:42

## 2021-05-07 RX ADMIN — TAMSULOSIN HYDROCHLORIDE SCH MG: 0.4 CAPSULE ORAL at 06:12

## 2021-05-07 RX ADMIN — ACETAMINOPHEN SCH MG: 500 TABLET ORAL at 03:00

## 2021-05-07 NOTE — PATHOLOGY
[facility] Accession Number: [id number]

.                                                                01

Material submitted:                                        .

hip - RIGHT HIP BONE AND TISSUE. Modifiers: right

.                                                                01

Clinical history:                                          .

OA RIGHT HIP

RIGHT TOTAL HIP ARTHROPLASTY

.                                                                02

**********************************************************************

Diagnosis:

Femoral head and separate segments of bone, cartilage, and synovial

tissue, right total hip arthroplasty:

- Advanced degenerative arthritis with focal subarticular fibrosis and

cystic degeneration.

(JPM:orion; 05/07/2021)

QMS  05/07/2021  0957 Local

**********************************************************************

.                                                                02

Electronically signed:                                     .

Leander Henley MD, Pathologist

NPI- [id number]

.                                                                01

Gross description:                                         .

The specimen is received in formalin, labeled "Charly Rm, right hip

bone and tissue".  Received is a femoral head measuring 4.7 x 4.6 x 4.4 cm

in greatest dimensions.  The articular surface is smooth to irregular in

contour with evidence of eburnation.  Mild osteophytic lipping is

identified.  Sectioning reveals yellow-tan cut surfaces with no grossly

distinct nodules or lesions.  The specimen is submitted representatively

in cassette A1, following decalcification.

.

Also received within the specimen container are bone reamings and

fragments of femoral neck measuring 9.2 x 7.5 x 2.2 cm in aggregate

dimensions.  Representative sections are submitted in cassette A2,

following light decalcification.

(CAA; 5/5/2021)

QAC/QAC  05/05/2021  1040 Local

.                                                                02

Pathologist provided ICD-10:

M16.11

.                                                                02

CPT                                                        .

041452, [id number]

Specimen Comment: A courtesy copy of this report has been sent to [phone], [phone]

Specimen Comment: [id number]

Specimen Comment: Report sent to  / DR RODRIGUEZ

***Performed at:  01

   [facility]

   [address], [place], [place]  [id number]

   MD Blake Rosario MD Phone:  [phone]

***Performed at:  02

   [facility]

   [address], [place]  [id number]

   MD Leander Henley MD Phone:  [phone]

## 2021-05-07 NOTE — DS
DATE OF DISCHARGE: 05/07/2021

ORTHOPEDIC DISCHARGE SUMMARY



PRINCIPAL DIAGNOSIS:  Degenerative joint disease, right hip.



PROCEDURE:  Right total hip arthroplasty.



DISPOSITION:  Skilled nursing facility.



DISPOSITION MEDICATIONS:  Include oxycodone 5 mg p.o. q.4 hours p.r.n. severe 

pain, tramadol 50 mg p.o. q.4 hours p.r.n. moderate pain, Flomax 0.4 mg p.o. 

daily for urinary retention and warfarin sodium as directed by anticoagulation 

clinic, resume preoperative medications.



DISCHARGE INSTRUCTIONS:  Activity is weightbearing as tolerated.  Avoid extremes

of range of motion of the right hip, but no formal hip precautions due to 

anterior approach.  Maintain JONH dressing, call if saturated, otherwise when 

suction machine stops, cut tail and tape over dressing to maintain seal.  Follow

up with Dr. Pompa or Dr. Murcia 2 weeks postoperatively.



BRIEF DESCRIPTION OF HOSPITAL COURSE:  The patient underwent a total hip 

arthroplasty and really overall had good pain control.  He did have some bloody 

drainage on his dressings, which required a change.  He also had postoperative 

urinary retention and required straight catheterization and that situation 

improved on Flomax, so he was urinating adequately  on date of discharge, 

05/07/2021.  He was also had some acute blood loss anemia and hemoglobin was 

7.7-7.8 range on postop day #2 and 7.7-7.6 range on postop day #3, but he was 

asymptomatic and ambulating well and he was discharged to skilled nursing 

facility in stable condition.







BAUTISTA

DR: Robyn   DD: 05/07/2021 19:01

DT: 05/07/2021 21:27   TID: [id number]

## 2021-05-07 NOTE — NUR
Slept well most of the noc.  Voiding less frequently and with more output each time.

Unsteady when ambulating, wants to walk on toes of right foot.  Very unsteady in bathroom, 
sets walker to side and hops toward toilet.  Explained safest way to use walker, pt 
verbalized understanding but doesn't demonstrate it.

## 2021-05-07 NOTE — NUR
Pharmacy Warfarin Dosing Note



S:Pharmacy consulted to assist with anticoagulation therapy started 05/03/21 with target 
INR: 1.6 - 2.5





O:CHAY LOYOLA is a 66 year old M with RUSS 



Allergies:No Known Drug Allergies



Height: 5 feet, 10 inches

Weight: 60.3 kg



LABS:

Last INR: 1.7 

Last HGB: 7.7 

Last HCT: 23.4 

Last PLT: - 



Ongoing Drug Interactions: FLUOXETINE, MELOXICAM 



A:INR within desired Range. Target Range for this patient is: 1.6 - 2.5



P: Warfarin dose: 2 mg will be given today prior to discharge.  Give 2 mg daily. Draw INR on 
Monday, 5/10/21, and request attending physician to dose warfarin for a goal INR 1.6 - 2.5 
through end of therapy 06/14/21 (6 weeks of therapy).

Indication for warfarin is prevention of VTE after major joint surgery.



 Naomi Lester RPH, 05/07/21 [id number]

## 2021-06-12 ENCOUNTER — HOSPITAL ENCOUNTER (EMERGENCY)
Dept: HOSPITAL 61 - ER | Age: 66
Discharge: HOME | End: 2021-06-12
Payer: COMMERCIAL

## 2021-06-12 VITALS — HEIGHT: 69 IN | BODY MASS INDEX: 14.82 KG/M2 | WEIGHT: 100.09 LBS

## 2021-06-12 VITALS — DIASTOLIC BLOOD PRESSURE: 50 MMHG | SYSTOLIC BLOOD PRESSURE: 112 MMHG

## 2021-06-12 DIAGNOSIS — E86.0: ICD-10-CM

## 2021-06-12 DIAGNOSIS — R91.1: ICD-10-CM

## 2021-06-12 DIAGNOSIS — R79.1: ICD-10-CM

## 2021-06-12 DIAGNOSIS — R06.02: ICD-10-CM

## 2021-06-12 DIAGNOSIS — N17.9: Primary | ICD-10-CM

## 2021-06-12 LAB
ALBUMIN SERPL-MCNC: 4.1 G/DL (ref 3.4–5)
ALBUMIN/GLOB SERPL: 1 {RATIO} (ref 1–1.7)
ALP SERPL-CCNC: 334 U/L (ref 46–116)
ALT SERPL-CCNC: 10 U/L (ref 16–63)
ANION GAP SERPL CALC-SCNC: 7 MMOL/L (ref 6–14)
AST SERPL-CCNC: 17 U/L (ref 15–37)
BASOPHILS # BLD AUTO: 0.1 X10^3/UL (ref 0–0.2)
BASOPHILS NFR BLD: 1 % (ref 0–3)
BILIRUB SERPL-MCNC: 0.4 MG/DL (ref 0.2–1)
BUN SERPL-MCNC: 21 MG/DL (ref 8–26)
BUN/CREAT SERPL: 15 (ref 6–20)
CALCIUM SERPL-MCNC: 9.1 MG/DL (ref 8.5–10.1)
CHLORIDE SERPL-SCNC: 103 MMOL/L (ref 98–107)
CO2 SERPL-SCNC: 29 MMOL/L (ref 21–32)
CREAT SERPL-MCNC: 1.4 MG/DL (ref 0.7–1.3)
EOSINOPHIL NFR BLD: 0 % (ref 0–3)
EOSINOPHIL NFR BLD: 0 X10^3/UL (ref 0–0.7)
ERYTHROCYTE [DISTWIDTH] IN BLOOD BY AUTOMATED COUNT: 15.6 % (ref 11.5–14.5)
GFR SERPLBLD BASED ON 1.73 SQ M-ARVRAT: 50.9 ML/MIN
GLUCOSE SERPL-MCNC: 88 MG/DL (ref 70–99)
HCT VFR BLD CALC: 31.2 % (ref 39–53)
HGB BLD-MCNC: 10.8 G/DL (ref 13–17.5)
LYMPHOCYTES # BLD: 0.9 X10^3/UL (ref 1–4.8)
LYMPHOCYTES NFR BLD AUTO: 15 % (ref 24–48)
MCH RBC QN AUTO: 30 PG (ref 25–35)
MCHC RBC AUTO-ENTMCNC: 35 G/DL (ref 31–37)
MCV RBC AUTO: 87 FL (ref 79–100)
MONO #: 0.5 X10^3/UL (ref 0–1.1)
MONOCYTES NFR BLD: 8 % (ref 0–9)
NEUT #: 4.5 X10^3/UL (ref 1.8–7.7)
NEUTROPHILS NFR BLD AUTO: 75 % (ref 31–73)
PLATELET # BLD AUTO: 360 X10^3/UL (ref 140–400)
POTASSIUM SERPL-SCNC: 3.3 MMOL/L (ref 3.5–5.1)
PROT SERPL-MCNC: 8.1 G/DL (ref 6.4–8.2)
RBC # BLD AUTO: 3.57 X10^6/UL (ref 4.3–5.7)
SODIUM SERPL-SCNC: 139 MMOL/L (ref 136–145)
WBC # BLD AUTO: 6.1 X10^3/UL (ref 4–11)

## 2021-06-12 PROCEDURE — 96360 HYDRATION IV INFUSION INIT: CPT

## 2021-06-12 PROCEDURE — 85379 FIBRIN DEGRADATION QUANT: CPT

## 2021-06-12 PROCEDURE — 36415 COLL VENOUS BLD VENIPUNCTURE: CPT

## 2021-06-12 PROCEDURE — 93005 ELECTROCARDIOGRAM TRACING: CPT

## 2021-06-12 PROCEDURE — 71045 X-RAY EXAM CHEST 1 VIEW: CPT

## 2021-06-12 PROCEDURE — 99285 EMERGENCY DEPT VISIT HI MDM: CPT

## 2021-06-12 PROCEDURE — 84484 ASSAY OF TROPONIN QUANT: CPT

## 2021-06-12 PROCEDURE — 80053 COMPREHEN METABOLIC PANEL: CPT

## 2021-06-12 PROCEDURE — 71275 CT ANGIOGRAPHY CHEST: CPT

## 2021-06-12 PROCEDURE — 85025 COMPLETE CBC W/AUTO DIFF WBC: CPT

## 2021-06-12 NOTE — RAD
Exam: Chest one view



INDICATION: Short of air



TECHNIQUE: Frontal view of the chest



Comparisons: 12/28/2020



FINDINGS:

The cardiomediastinal silhouette and pulmonary vessels are within normal limits.



Persistent nodular opacity at the right lower lobe. No pleural effusion.



IMPRESSION:

Right lower lobe nodular opacity. CT is recommended for further evaluation.



Electronically signed by: Megha Tatum MD (6/12/2021 8:16 PM) [facility]

## 2021-06-12 NOTE — PHYS DOC
Past Medical History


Past Medical History:  Hypertension, Other


Additional Past Medical Histor:  OCD, ALCOHOL ABUSE PATIENT STATES "YEARS AGO"


Past Surgical History:  Other


Additional Past Surgical Histo:  BILATERAL HIP SURGERY


Smoking Status:  Never Smoker


Alcohol Use:  Sober


Drug Use:  None





General Adult


EDM:


Chief Complaint:  SHORTNESS OF BREATH





HPI:


HPI:


66-year-old male past medical history of hypertension, ocd, bruxism, and former 

alcohol abuse presents to the ED with complaints of " short of breath and 

breathing heard and felt dizzy," that occurred while patient was at home, lasted

for a few minutes.  States his air conditioner is on long because he lives with 

his elderly father who gets cold easily.  Reports right hip pain after surgery 1

month ago with Dr. Pompa.  Is on oxycodone 5 mg at home and forgot to take his

medication today.  No history of DVT or PE.  Reports no appetite and has lost 30

pounds.  No history of Covid.  Is not vaccinated for Covid.  No history of lung 

disease but does report smoking tobacco.





Review of Systems:


Review of Systems:


Constitutional:   Denies fever or chills. []


Eyes:   Denies change in visual acuity. []


HENT:   Denies nasal congestion or sore throat. [] 


Respiratory:   Denies cough or hemoptysis


Cardiovascular:   Denies chest pain or or syncope


GI:   Denies  nausea, vomiting, or diarrhea. [] 


Musculoskeletal:   Denies back pain or joint pain or unilateral lower extremity 

swelling


Integument:   Denies rash or diaphoresis


Neurologic:   Denies headache, focal weakness or sensory changes. [] 


Endocrine:   Denies polyuria or polydipsia. [] 


Lymphatic:  Denies swollen glands. [] 


Psychiatric:  Denies depression or anxiety. []





Heart Score:


C/O Chest Pain:  No


Risk Factors:


Risk Factors:  DM, Current or recent (<one month) smoker, HTN, HLP, family 

history of CAD, obesity.


Risk Scores:


Score 0 - 3:  2.5% MACE over next 6 weeks - Discharge Home


Score 4 - 6:  20.3% MACE over next 6 weeks - Admit for Clinical Observation


Score 7 - 10:  72.7% MACE over next 6 weeks - Early Invasive Strategies





Allergies:


Allergies:





Allergies








Coded Allergies Type Severity Reaction Last Updated Verified


 


  No Known Drug Allergies    21 No











Physical Exam:


PE:





Constitutional: No acute distress, very cachectic and malnourished-does not 

appear to care well for himself


HENT: Normocephalic, atraumatic,


Eyes: EOMI, conjunctiva normal, no discharge.  


Neck: Normal range of motion,  supple, 


Cardiovascular: S1/2 present, regular rhythm


Lungs & Thorax: Speaking in full sentences, bilateral equal chest rise, no 

tachypnea or increased work of breathing, no wheezing, rales, crackles, bilater

al equal breath sounds


Abdomen:  soft, no tenderness, 


Skin: Warm, dry, no erythema, no rash. [] 


Back: No tenderness, no CVA tenderness. [] 


Extremities: No tenderness, no cyanosis, no unilateral lower extremity edema


Neurologic: Alert and oriented X 3, normal motor function, normal sensory 

function, no focal deficits noted. []


Psychologic: Affect normal, judgement normal, mood normal. []





EKG:


EKG:


Sinus rhythm at 61 bpm, no axis deviation, T wave inversion aVL and V2, no ST 

elevations or ST depressions, no active chest pain





Radiology/Procedures:


Radiology/Procedures:


                                 IMAGING REPORT





                                     Signed





PATIENT: CHAY LOYOLA MACCOUNT: [id number]     MRN#: [id number]


: 1955           LOCATION: ER              AGE: 66


SEX: M                    EXAM DT: 21         ACCESSION#: [id number]


STATUS: REG ER            ORD. PHYSICIAN: SIXTO NO DO


REASON: soa, r/o pe,    OMNI 350 80 ML IV


PROCEDURE: CT ANGIOGRAPHY CHEST





Exam: CT of chest with contrast





INDICATION: Short of air





TECHNIQUE: Sequential axial images through the chest obtained following the 

administration of 80 mL of Isovue-370 IV contrast. Sagittal and coronal 

reformatted images were reconstructed from the axial data and reviewed. 3-D 

reformatted images were reconstructed from the axial data and reviewed.





Exposure: One or more of the following in the visualized dose reduction 

techniques were utilized for this examination:


1.  Automated exposure control


2.  Adjustment of the MA and/or KV according to patient size


3.  Use of iterative of reconstructive technique





Comparisons: Chest x-ray same day





FINDINGS:


Visualized portions of the thyroid are unremarkable. No enlarged mediastinal 

lymph nodes are identified.





Heart size is normal. No pericardial effusion. Thoracic aorta has a normal 

course and caliber. Pulmonary artery is not enlarged. No pulmonary embolus 

identified within the main, lobar or segmental pulmonary arteries.





Airways are patent. No consolidation or pneumothorax. There is a nodular opacity

 at the right lower lobe measuring 1.4 cm series 4 image 105. There is mild 

adjacent groundglass opacity. Mild centrilobular emphysematous change is also 

noted.





No pleural effusion or thickening.





Visualized upper abdomen is unremarkable.





No suspicious osseous lesions or acute fractures.





IMPRESSION:


1.  No pulmonary embolus identified within the main, lobar or segmental 

pulmonary arteries.


2.  A 1.4 cm nodular opacity in the right lower lobe. Recommend follow-up with 

either PET/CT, or 3 month follow-up chest CT








Electronically signed by: Megha Coronado MD (2021 9:04 PM) [facility]














DICTATED and SIGNED BY:     MEGHA CORONADO MD


DATE:     21MTH0 0


                                 IMAGING REPORT





                                     Signed





PATIENT: CHAY LOYOLA MACCOUNT: [id number]     MRN#: [id number]


: 1955           LOCATION: ER              AGE: 66


SEX: M                    EXAM DT: 21         ACCESSION#: [id number]


STATUS: REG ER            ORD. PHYSICIAN: SIXTO NO DO


REASON: soa


PROCEDURE: CHEST AP ONLY





Exam: Chest one view





INDICATION: Short of air





TECHNIQUE: Frontal view of the chest





Comparisons: 2020





FINDINGS:


The cardiomediastinal silhouette and pulmonary vessels are within normal limits.





Persistent nodular opacity at the right lower lobe. No pleural effusion.





IMPRESSION:


Right lower lobe nodular opacity. CT is recommended for further evaluation.





Electronically signed by: Megha Coronado MD (2021 8:16 PM) [facility]














DICTATED and SIGNED BY:     MEGHA CORONADO MD


DATE:     21MTH0 0





Course & Med Decision Making:


Course & Med Decision Making


Pertinent Labs and Imaging studies reviewed. (See chart for details)





Concern for brief episode of shortness of breath, likely related to warm weather

 and dehydration.  Repeat heart rate in normal sinus rhythm.  Patient not 

requiring any oxygen.  Symptoms have resolved.  Anemia improved from prior.  

Creatinine slightly elevated.  Was treated with fluids in the ED.  CTA with no 

pulmonary embolus or infiltrates.  Report printed off and given to patient to 

take to primary care physician to follow-up pulmonary nodules.  Will discharge 

home with strict ED return precautions were given for syncope, chest pain, 

abscess, unilateral leg swelling, neurologic deficits or severe pain. Encouraged

 urgent outpatient follow-up with PMD in 24 to 48 hours.  Life-threatening 

processes were considered but are low suspicion at this time, given history, p

hysical exam and ED workup. Pt was educated on all prescription medications and 

adverse effects.  All patient's questions were answered and pt was stable at 

time of discharge.





Life/limb-threatening differential includes but is not limited to, ACS, dysrhyt

hmia, pneumothorax or hemothorax, pulmonary embolus, pneumonia, 

bronchoconstriction, pulmonary edema, angioedema, epiglottitis, tracheitis, 

Chuy's angina, RPA/PTA, anaphylaxis, angioedema, cardiac tamponade or murmurs,

 pericarditis, myocarditis, poisoning or toxicity, sepsis or 

autoimmune/neurologic disease.





I spoken with the patient and her caregivers.  I explained the patient's 

condition, diagnoses and treatment plan based on the information available to me

 at this time.  I have answered the patient and her caregiver's questions and 

addressed any concerns.  The patient and her caregivers have a good 

understanding of patient's diagnosis, condition and treatment plan as can be 

expected at this point.  Vital signs have been stable.  Patient's condition is 

stable and appropriate for discharge from the emergency department. 





Patient will pursue further outpatient evaluation with primary care physician or

 other designated or consulting physician as outlined in the discharge 

instructions.  The patient and/or caregivers are agreeable to this plan of care 

and follow-up instructions have been explained in detail.  The patient and/or 

caregivers have received these instructions in written form and have expressed 

an understanding of the discharge instructions.  The patient and/or caregivers 

are aware that any significant change of condition or worsening of symptoms 

should prompt immediate return to this or the closest emergency department or 

call to 911.





Ibis Disclaimer:


Dragon Disclaimer:


This electronic medical record was generated, in whole or in part, using a voice

 recognition dictation system.





Departure


Departure


Impression:  


   Primary Impression:  


   Dyspnea


   Additional Impressions:  


   Elevated d-dimer


   CELENA (acute kidney injury)


   Dehydration


   Pulmonary nodule


Referrals:  


CHIKIS RODRIGUEZ (PCP)


In 1 to 2 days for reevaluation, to evaluate kidney function, and pulmonary nod

ule


Patient Instructions:  Acute Kidney Injury, Shortness of Breath





Additional Instructions:  


EMERGENCY DEPARTMENT GENERAL DISCHARGE INSTRUCTIONS





Thank you for coming to [facility] Emergency Department (ED) 

today and 


trusting us with you care.  We trust that you had a positive experience in our 

Emergency 


Department.  If you wish to speak to the department management, you may call the

 Director at 


[phone].





YOUR FOLLOW UP INSTRUCTIONS ARE AS FOLLOWS:





1.  Do you have a private Doctor?  If you do not have a private doctor, please 

ask for a 


resource list of physicians or clinics that may be able to assist you with 

follow up care.





2.  The Emergency Physicain has interpreted your x-rays.  The X-Ray specialist 

will also 


review them.  If there is a change in the findings, you will be notified in 48 

hours when at 


all possible.





3.  A lab test or culture has been done, your results will be reviewed and you 

will be 


notified if you need a change in treatment.





ADDITIONAL INSTRUCTIONS AND INFORMATION:





1.  Your care today has been supervised by a physician who is specially trained 

in emergency 


care.  Many problems require more than one evaluation for a complete diagnosis 

and 


treatment.  We recommend that you schedule your follow up appointment as 

recommended to 


ensure complete treatment of you illness or injury.  If you are unable to obtain

 follow up 


care and continue to have a problem, or if your condition worsens, we recommend 

that you 


return to the ED.





2.  We are not able to safely determine your condition over the phone nor are we

 able to 


give sound medical advice over the phone.  For these safety reasons, if you call

 for medical 


advice we will ask you to come to the ED for further evaluation.





3.  If you have any questions regarding these discharge instructions please call

 the ED at 


[phone].





SAFETY INFORMATION:





In the interest of safety, wellness, and injury prevention; we encourage you to 

wear your 


sealbelt, if you smoke; quite smoking, and we encourage family to use a 

protective helmet 


for bicycling and other sporting events that present an increased risk for head 

injury.





IF YOUR SYMPTOMS WORSEN OR NEW SYMPTOMS DEVELOP, OR YOU HAVE CONCERNS ABOUT YOUR

 CONDITION; 


OR IF YOUR CONDITION WORSENS WHILE YOU ARE WAITING FOR YOUR FOLLOW UP 

APPOINTMENT; EITHER 


CONTACT YOUR PRIMARY CARE DOCTOR, THE PHYSICIAN WHOSE NAME AND NUMBER YOU WERE 

GIVEN, OR 


RETURN TO THE ED IMMEDIATELY.











SIXTO SINHA DO               2021 18:22

## 2021-06-12 NOTE — RAD
Exam: CT of chest with contrast



INDICATION: Short of air



TECHNIQUE: Sequential axial images through the chest obtained following the administration of 80 mL o
f Isovue-370 IV contrast. Sagittal and coronal reformatted images were reconstructed from the axial d
cora and reviewed. 3-D reformatted images were reconstructed from the axial data and reviewed.



Exposure: One or more of the following in the visualized dose reduction techniques were utilized for 
this examination:

1.  Automated exposure control

2.  Adjustment of the MA and/or KV according to patient size

3.  Use of iterative of reconstructive technique



Comparisons: Chest x-ray same day



FINDINGS:

Visualized portions of the thyroid are unremarkable. No enlarged mediastinal lymph nodes are identifi
ed.



Heart size is normal. No pericardial effusion. Thoracic aorta has a normal course and caliber. Pulmon
wilner artery is not enlarged. No pulmonary embolus identified within the main, lobar or segmental pulmo
nary arteries.



Airways are patent. No consolidation or pneumothorax. There is a nodular opacity at the right lower l
obe measuring 1.4 cm series 4 image 105. There is mild adjacent groundglass opacity. Mild centrilobul
ar emphysematous change is also noted.



No pleural effusion or thickening.



Visualized upper abdomen is unremarkable.



No suspicious osseous lesions or acute fractures.



IMPRESSION:

1.  No pulmonary embolus identified within the main, lobar or segmental pulmonary arteries.

2.  A 1.4 cm nodular opacity in the right lower lobe. Recommend follow-up with either PET/CT, or 3 mo
nth follow-up chest CT





Electronically signed by: Megha Tatum MD (6/12/2021 9:04 PM) [facility]

## 2021-06-13 NOTE — EKG
[facility]

              [address] [place] [address]

Test Date:    2021               Test Time:    18:49:08

Pat Name:     CHAY LOYOLA          Department:   

Patient ID:   [id number]           Room:          

Gender:       M                        Technician:   

:          1955               Requested By: SIXTO NO

Order Number: [id number]           Reading MD:     

                                 Measurements

Intervals                              Axis          

Rate:         61                       P:            90

RI:           156                      QRS:          85

QRSD:         84                       T:            74

QT:           426                                    

QTc:          430                                    

                           Interpretive Statements

SINUS ARRHYTHMIA

OTHERWISE NORMAL ECG

RI6.02

No previous ECG available for comparison

## 2021-07-23 ENCOUNTER — HOSPITAL ENCOUNTER (EMERGENCY)
Dept: HOSPITAL 61 - ER | Age: 66
Discharge: HOME | End: 2021-07-23
Payer: COMMERCIAL

## 2021-07-23 VITALS
SYSTOLIC BLOOD PRESSURE: 138 MMHG | DIASTOLIC BLOOD PRESSURE: 66 MMHG | DIASTOLIC BLOOD PRESSURE: 66 MMHG | SYSTOLIC BLOOD PRESSURE: 138 MMHG

## 2021-07-23 VITALS — WEIGHT: 97.22 LBS | HEIGHT: 66.5 IN | BODY MASS INDEX: 15.44 KG/M2
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
## 2021-07-23 DIAGNOSIS — I10: ICD-10-CM
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
## 2021-07-23 DIAGNOSIS — S70.01XA: Primary | ICD-10-CM
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
## 2021-07-23 PROCEDURE — 73502 X-RAY EXAM HIP UNI 2-3 VIEWS: CPT

## 2021-07-23 PROCEDURE — 99284 EMERGENCY DEPT VISIT MOD MDM: CPT

## 2021-07-23 NOTE — RAD
Right hip 2 views with one view pelvis.



HISTORY: Fall



Single view was taken of the pelvis. There is a total joint prosthesis on the right. There is signifi
cant myositis ossificans surrounding the prosthesis and hip joint on the right. There is no acute pel
sanchez fracture.



AP and lateral views were taken of the right hip. There is extensive myositis ossificans about the hi
p. There is no acute fracture.



IMPRESSION:

1. Right hip prosthesis in position.

2. 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

3. No acute left hip fracture.



Electronically signed by: Albaro Perez MD (7/23/2021 10:56 PM) [facility]

## 2021-07-23 NOTE — PHYS DOC
Past Medical History


Past Medical History:  Hypertension, Other


Additional Past Medical Histor:  OCD, ALCOHOL ABUSE PATIENT STATES "YEARS AGO"


 (POLINA LIMA)


Past Surgical History:  Other


Additional Past Surgical Histo:  BILATERAL HIP AND SHIN SX


 (POLINA LIMA)


Smoking Status:  Never Smoker


Alcohol Use:  None


Drug Use:  None


 (POLINA LIMA)





General Adult


EDM:


Chief Complaint:  HIP PAIN





HPI:


HPI:





Patient is a 66  year old male who presents to the emergency department 

reporting that he fell in his psychiatrist office just prior to arrival.  

Patient reports falling onto his right hip.  Patient states he fears he may have

broken his right hip again.  Patient reports a right hip replacement years ago. 

Patient states it hurts a 10 out of 10.  Patient denies any other injuries from 

his fall, denies hitting his head, denies loss of consciousness.  Patient denies

any other physical complaints or physical concerns.


 (POLINA LIMA)





Review of Systems:


Review of Systems:


14 body systems of review of systems have been reviewed.  See HPI for pertinent 

positives and negative responses, otherwise all other systems are negative, 

nonpertinent or noncontributory.


Constitutional: Negative except as outlined in HPI above.


Skin: Negative except as outlined in HPI above.


Eyes:   Negative except as outlined in HPI above.


HENT: Negative except as outlined in HPI above.


Respiratory:   Negative except as outlined in HPI above.


Cardiovascular:   Negative except as outlined in HPI above.


GI:   Negative except as outlined in HPI above.


:  Negative except as outlined in HPI above.


Musculoskeletal:   Negative except as outlined in HPI above.


Integument:   Negative except as outlined in HPI above.


Neurologic:   Negative except as outlined in HPI above.


Endocrine:   Negative except as outlined in HPI above.


Lymphatic:  Negative except as outlined in HPI above.


Psychiatric:  Negative except as outlined in HPI above.


 (POLINA LIMA)





Heart Score:


C/O Chest Pain:  No


Risk Factors:


Risk Factors:  DM, Current or recent (<one month) smoker, HTN, HLP, family 

history of CAD, obesity.


Risk Scores:


Score 0 - 3:  2.5% MACE over next 6 weeks - Discharge Home


Score 4 - 6:  20.3% MACE over next 6 weeks - Admit for Clinical Observation


Score 7 - 10:  72.7% MACE over next 6 weeks - Early Invasive Strategies


 (POLINA LIMA)





Allergies:


Allergies:





Allergies








Coded Allergies Type Severity Reaction Last Updated Verified


 


  No Known Drug Allergies    21 No








 (POLINA LIMA)





Physical Exam:


PE:





Constitutional: Well developed, well nourished, no acute distress, non-toxic 

appearance.  66-year-old male in no apparent distress.  Ambulatory to room with 

steady gait.


HENT: Normocephalic, atraumatic.


Eyes: Conjunctiva normal, no discharge.


Neck: Normal range of motion, no stridor.


Cardiovascular: No cyanosis appreciated, distal cap refill less than 2 seconds.


Lungs & Thorax: Patient is in no respiratory distress, no audible adventitious 

lung sounds appreciated.


Abdomen: Nontender, no abnormalities noted.


Skin: Warm, dry, no erythema, no rash.  


Back: No tenderness, no deformities.


Extremities: No tenderness, no cyanosis, no clubbing, ROM intact, no edema.  

Except for right lower extremity, hip area.  Patient complains of pain to 

palpation, no deformity noted, patient ambulating without difficulty, steady 

gait, no outward rotation or shortening of the right lower extremity, +2 

posterior tibial/dorsalis pedis pulse.  No swelling appreciated.  No crepitus 

appreciated.  Distal cap refill is less than 2 seconds.


Neurologic: Alert and oriented X 3, normal motor function, normal sensory 

function, no focal deficits noted. 


Psychologic: Affect normal, judgement normal, mood normal. 


 (POLINA LIMA)





Current Patient Data:


Vital Signs:





                                   Vital Signs








  Date Time  Temp Pulse Resp B/P (MAP) Pulse Ox O2 Delivery O2 Flow Rate FiO2


 


21 20:50 97.9 86 16 138/66 (70) 96 Room Air  





 97.9       








 (POLINA LIMA)





EKG:


EKG:


[]


 (POLINA LIMA)





Radiology/Procedures:


Radiology/Procedures:


PATIENT: CHAY LOYOLA MACCOUNT: [id number]     MRN#: [id number]


: 1955           LOCATION: ER              AGE: 66


SEX: M                    EXAM DT: 21         ACCESSION#: [id number]


STATUS: REG ER            ORD. PHYSICIAN: POLINA LIMA


REASON: fall


PROCEDURE: HIP RIGHT 2V WITH PELVIS





Right hip 2 views with one view pelvis.





HISTORY: Fall





Single view was taken of the pelvis. There is a total joint prosthesis on the 

right. There is significant myositis ossificans surrounding the prosthesis and 

hip joint on the right. There is no acute pelvic fracture.





AP and lateral views were taken of the right hip. There is extensive myositis 

ossificans about the hip. There is no acute fracture.





IMPRESSION:


1. Right hip prosthesis in position.


2. 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


3. No acute left hip fracture.





Electronically signed by: Albaro Perez MD (2021 10:56 PM) [facility]














DICTATED and SIGNED BY:     ALBARO PEREZ MD


DATE:     21 [id number] 0


 (POLINA LIMA)





Course & Med Decision Making:


Course & Med Decision Making


Pertinent Labs and Imaging studies reviewed. (See chart for details)





66-year-old male, vital signs reviewed, presents to the emergency department 

with chief complaint of right hip pain after a fall.  Patient's physical 

examination unremarkable, will order x-ray of right hip and pelvis to rule out 

acute fracture.  Will give the patient 1 Percocet p.o. for reported 10 out of 10

 pain.








X-ray unremarkable, no fracture noted per house radiologist interpretation.  

Discussed findings with patient, patient states he is happy to know he did not 

break his hip.  Discussed with the patient all findings and diagnostic testing 

as well as the need to follow-up with their primary care provider for further 

evaluation and treatment or return to the ED if any new or worsening symptoms.  

Strict return precautions were also discussed at length, the patient voiced 

understanding and agreement with the discharge planning.  The patient was 

nontoxic in appearance, in no apparent distress, and hemodynamically stable at 

the time of disposition.


 (POLINA LIMA)





Dragon Disclaimer:


Dragon Disclaimer:


This electronic medical record was generated, in whole or in part, using a voice

 recognition dictation system.


 (POLINA LIMA)





Departure


Departure


Impression:  


   Primary Impression:  


   Fall


   Qualified Codes:  W19.XXXA - Unspecified fall, initial encounter


   Additional Impression:  


   Contusion of right hip


   Qualified Codes:  S70.01XA - Contusion of right hip, initial encounter


Disposition:   HOME / SELF CARE / HOMELESS


Condition:  GOOD


Referrals:  


ALHOEINI,CHIKIS H (PCP)


Patient Instructions:  Contusion, Fall Prevention and Home Safety





Additional Instructions:  


You were seen today in the emergency department for a fall onto your right hip. 

 An x-ray was performed, there was no fracture or injury noted.  Please use ice 

packs 30 minutes on and 30 minutes off while awake as we discussed.  Please 

follow-up with your primary care physician this Monday for ongoing pain 

management.  Thank you for visiting our Emergency Department.  It was a pleasure

 taking care of you today in the emergency department and we appreciate you 

trusting us with your care. If any additional problems come up don't hesitate to

 return to visit us. Please follow up with your primary care provider so they 

can plan additional care if needed and know about the problem that you had. If 

symptoms worsen come back to the Emergency Department. Any concerning symptoms 

that start such as chest pain, shortness of air, weakness or numbness on one 

side of the body, running high fevers or any other concerning symptoms return to

 the ER.





EMERGENCY DEPARTMENT GENERAL DISCHARGE INSTRUCTIONS





Thank you for coming to [facility] Emergency Department (ED) 

today and 


trusting us with you care.  We trust that you had a positive experience in our 

Emergency 


Department.  If you wish to speak to the department management, you may call the

 Director at 


[phone].





YOUR FOLLOW UP INSTRUCTIONS ARE AS FOLLOWS:





1.  Do you have a private Doctor?  If you do not have a private doctor, please 

ask for a 


resource list of physicians or clinics that may be able to assist you with 

follow up care.





2.  The Emergency Physicain has interpreted your x-rays.  The X-Ray specialist 

will also 


review them.  If there is a change in the findings, you will be notified in 48 

hours when at 


all possible.





3.  A lab test or culture has been done, your results will be reviewed and you 

will be 


notified if you need a change in treatment.





ADDITIONAL INSTRUCTIONS AND INFORMATION:





1.  Your care today has been supervised by a physician who is specially trained 

in emergency 


care.  Many problems require more than one evaluation for a complete diagnosis 

and 


treatment.  We recommend that you schedule your follow up appointment as 

recommended to 


ensure complete treatment of you illness or injury.  If you are unable to obtain

 follow up 


care and continue to have a problem, or if your condition worsens, we recommend 

that you 


return to the ED.





2.  We are not able to safely determine your condition over the phone nor are we

 able to 


give sound medical advice over the phone.  For these safety reasons, if you call

 for medical 


advice we will ask you to come to the ED for further evaluation.





3.  If you have any questions regarding these discharge instructions please call

 the ED at 


[phone].





SAFETY INFORMATION:





In the interest of safety, wellness, and injury prevention; we encourage you to 

wear your 


sealbelt, if you smoke; quite smoking, and we encourage family to use a 

protective helmet 


for bicycling and other sporting events that present an increased risk for head 

injury.





IF YOUR SYMPTOMS WORSEN OR NEW SYMPTOMS DEVELOP, OR YOU HAVE CONCERNS ABOUT YOUR

 CONDITION; 


OR IF YOUR CONDITION WORSENS WHILE YOU ARE WAITING FOR YOUR FOLLOW UP 

APPOINTMENT; EITHER 


CONTACT YOUR PRIMARY CARE DOCTOR, THE PHYSICIAN WHOSE NAME AND NUMBER YOU WERE 

GIVEN, OR 


RETURN TO THE ED IMMEDIATELY.


Scripts


Oxycodone/Apap 5-325 (PERCOCET 5-325 MG TABLET **) 1 Each Tablet


1 TAB PO PRN BID PRN for severe pain MDD 2 Tablet(s) for 5 Days, #10 TAB 0 

Refills


   Prov: POLINA LIMA         21





Attending Signature


Attending Signature


I have reviewed the PA/NP's note and plan of care. I was available for 

consultation as needed during the patient's visit in the emergency department. I

 agree with the clinical impression, plan, and disposition.


 (POLINA LYNN DO)











POLINA LIMA       2021 23:17


POLINA LYNN DO             2021 18:30

## 2021-09-29 ENCOUNTER — HOSPITAL ENCOUNTER (OUTPATIENT)
Dept: HOSPITAL 61 - KCIC CT | Age: 66
End: 2021-09-29
Attending: NURSE PRACTITIONER
Payer: COMMERCIAL

## 2021-09-29 DIAGNOSIS — I70.0: ICD-10-CM

## 2021-09-29 DIAGNOSIS — R91.8: Primary | ICD-10-CM

## 2021-09-29 DIAGNOSIS — J47.9: ICD-10-CM

## 2021-09-29 PROCEDURE — 71250 CT THORAX DX C-: CPT

## 2021-09-29 NOTE — KCIC
INDICATION: Reason: PULMONARY NODULE / Spl. Instructions:  / History: 



COMPARISON: June 2021



TECHNIQUE: 



Axial CT images obtained through the chest. No intravenous contrast



One or more of the following individualized dose reduction techniques were utilized for this examinat
ion:  1. Automated exposure control;  2. Adjustment of the mA and/or kV according to patient size;  3
. Use of iterative reconstruction technique.



FINDINGS:



 The right lower lung nodule persists on this examination and currently measures approximately 14 mm.
 Similar prior.

There is some mild bronchiectasis.

Scattered cystic changes within the lungs bilaterally which can be seen with emphysema.

There is some smaller subpleural regions of nodularity again seen most prominent in the right upper l
kat. This is grossly similar to prior. There is also a nodule at the right lung apex again seen measu
ring up to about 6 mm.

Partial visualization of right renal cystic lesion.

Partially visualized stomach is distended which could be from recent meal unless the patient has symp
toms of delayed gastric emptying. There is some debris within the distal esophagus.

Calcific atherosclerosis throughout the thoracic aorta.

Small amount of pericardial fluid. Scattered small lymph nodes within the mediastinum.

There is some heterogeneity of the thyroid.

Degenerative changes of the spine.





IMPRESSION:



Repeat demonstration of pulmonary nodules with dominant nodule again seen in the right lower lobe sim
ilar in appearance compared to prior examination. Differential considerations include primary right l
kat neoplasm, inflammatory nodule, parenchymal scarring or round atelectasis. Further workup option i
ncludes obtaining a PET/CT but if a PET/CT is not desired at this time continued follow-up will be ne
eded in 3 months to ensure no growth. The other smaller nodules can be followed at that time as well.




Hyperexpanded lungs with cystic changes bilaterally which can be seen with chronic lung disease such 
as emphysema.



Calcific atherosclerosis.



Electronically signed by: Rocael Jean MD (9/29/2021 6:38 PM) [id number]